# Patient Record
Sex: FEMALE | Race: AMERICAN INDIAN OR ALASKA NATIVE
[De-identification: names, ages, dates, MRNs, and addresses within clinical notes are randomized per-mention and may not be internally consistent; named-entity substitution may affect disease eponyms.]

---

## 2019-02-04 ENCOUNTER — HOSPITAL ENCOUNTER (EMERGENCY)
Dept: HOSPITAL 42 - ED | Age: 64
Discharge: HOME | End: 2019-02-04
Payer: MEDICAID

## 2019-02-04 VITALS — TEMPERATURE: 98 F | RESPIRATION RATE: 20 BRPM | HEART RATE: 70 BPM | OXYGEN SATURATION: 96 %

## 2019-02-04 VITALS — SYSTOLIC BLOOD PRESSURE: 140 MMHG | DIASTOLIC BLOOD PRESSURE: 99 MMHG

## 2019-02-04 VITALS — BODY MASS INDEX: 40.2 KG/M2

## 2019-02-04 DIAGNOSIS — R21: Primary | ICD-10-CM

## 2019-02-04 NOTE — ED PDOC
Arrival/HPI





- General


Historian: Patient





- History of Present Illness


Narrative History of Present Illness (Text): 





02/04/19 22:02


63yr old female presents today with rash to both arms x 1+ weeks. pt denies new 

soaps lotions detergents or perfumes. Patient has not taken any medications at 

home. Patient states she has had this once in the past and it resolved with a 

unknown cream. Patient states she has an appointment with the dermatologist Dr. VILLASEÑOR tomorrow. Pt states she couldnt deal with the itching so she came to the 

ER. pt denies headache, dizziness,weakness. no cp or sob. no other complaints. 





<Alicia Jules - Last Filed: 02/04/19 22:02>





<Esteban Oro - Last Filed: 02/04/19 23:01>





- General


Chief Complaint: Abnormal Skin Integrity


Time Seen by Provider: 02/04/19 18:27





Past Medical History





- Provider Review


Nursing Documentation Reviewed: Yes





- Travel History


Have you recently traveled outside [place] w/in the past 3 mons?: No





- Psychiatric


Hx Substance Use: No





- Surgical History


Hx Appendectomy: Yes


Hx Cholecystectomy: Yes


Hx Tonsillectomy: Yes





<Alicia Jules - Last Filed: 02/04/19 22:02>





Family/Social History





- Physician Review


Nursing Documentation Reviewed: Yes


Family/Social History: Unknown Family HX


Smoking Status: Never Smoked


Hx Alcohol Use: No


Hx Substance Use: No





<Alicia Jules - Last Filed: 02/04/19 22:02>





Allergies/Home Meds





<Alicia Jules - Last Filed: 02/04/19 22:02>





<Esteban Oro - Last Filed: 02/04/19 23:01>


Allergies/Adverse Reactions: 


Allergies





No Known Allergies Allergy (Verified 02/04/19 21:11)


   











Review of Systems





- Review of Systems


Constitutional: absent: Fatigue, Fevers


Respiratory: absent: SOB, Cough


Cardiovascular: absent: Chest Pain, Palpitations


Gastrointestinal: absent: Abdominal Pain, Nausea, Vomiting


Musculoskeletal: absent: Arthralgias, Back Pain, Neck Pain


Skin: Rash, Pruritis


Psychiatric: absent: Anxiety, Depression





<Alicia Jules - Last Filed: 02/04/19 22:02>





Physical Exam


Vital Signs Reviewed: Yes





Vital Signs











  Temp Pulse Resp BP Pulse Ox


 


 02/04/19 21:16  98.0 F  70  20  167/119 H  96


 


 02/04/19 20:00  97.9 F  75  16  163/89 H  95











Temperature: Afebrile


Blood Pressure: Hypertensive


Pulse: Regular


Respiratory Rate: Normal


Appearance: Positive for: Well-Appearing, Non-Toxic, Comfortable


Pain Distress: None


Mental Status: Positive for: Alert and Oriented X 3





- Systems Exam


Head: Present: Atraumatic


Mouth: Present: Moist Mucous Membranes


Respiratory/Chest: Present: Clear to Auscultation


Cardiovascular: Present: Regular Rate and Rhythm


Upper Extremity: Present: Normal ROM, NORMAL PULSES, Neurovascularly Intact, 

Capillary Refill < 2s, Other (right hand; there are multiple erythematous 

pinpoint papules and raised plaques noted to the dorsal aspect. on the left hand

there are multiplepinpoint papules and raised plaques noted to the dorsal aspect

and along the volar aspect aspect of the distal forearm. non tender. non 

blanching.  ).  No: Tenderness, Swelling


Neurological: Present: GCS=15, Speech Normal


Skin: Present: Warm, Dry


Psychiatric: Present: Alert, Oriented x 3





<Alicia Jules - Last Filed: 02/04/19 22:02>





Vital Signs











  Temp Pulse Resp BP Pulse Ox


 


 02/04/19 22:42  98.0 F  70  20  140/99 H  96


 


 02/04/19 21:16  98.0 F  70  20  167/119 H  96


 


 02/04/19 20:00  97.9 F  75  16  163/89 H  95














<Esteban Oro - Last Filed: 02/04/19 23:01>





Medical Decision Making


ED Course and Treatment: 





02/04/19 22:17


Patient is nontoxic well-appearing in no distress with stable vital signs no 

angioedema. 


Lungs are clear to auscultation bilaterally there is no wheezing noted. 








benadryl po 





pt has appointment with Dermatologist tomorrow. 


I advised taking Benadryl every 6 hours as needed for itch and apply 

hydrocortisone cream to the rash. pt was advised to see the dermatologist 

tomorrow and not miss her appointment. Advised patient to follow up with primary

care physician within the next 2 days and return if symptoms worsen persist or 

if new symptoms develop





pt was seen and evaluated by dr. oro





pt was made aware of elevated blood pressure. pt is asymptomatic. pt was advised

to f/u with PMD regarding elevated blood pressure in er and need for repeat 

testing. pt was made aware of risks associated with elevated BP. 








Patient verbalizes understanding of discharge instructions and need for 

immediate followup.








all aspects of this case were discussed the attending of record. 





Impression:rash


Benadryl every 6 hours as needed for itch


apply hydrocortisone twice daily to affected areas sparingly. 


Follow up with the Dermatologist tomorrow. 


Follow up with the primary care physician within the next 2 days. 


Return if symptoms worsen persist or if new symptoms develop: Shortness of 

breath, feeling of throat closing, difficulty speaking or any other concerning 

symptoms develop


Reassessment Condition: Re-examined, Improved





- Medication Orders


Current Medication Orders: 











Diphenhydramine HCl (Benadryl)  25 mg PO ONCE ONE


   Stop: 02/04/19 22:02











<Alicia Jules - Last Filed: 02/04/19 22:02>





- Medication Orders


Current Medication Orders: 














Discontinued Medications





Diphenhydramine HCl (Benadryl)  25 mg PO ONCE ONE


   Stop: 02/04/19 22:02


   Last Admin: 02/04/19 22:22  Dose: 25 mg











<Esteban Oro - Last Filed: 02/04/19 23:01>





- PA / NP / Resident Statement


SACHA has reviewed & agrees with the documentation as recorded.


SACHA has examined the patient and agrees with the treatment plan.





<Esteban Oro - Last Filed: 02/04/19 23:01>





Disposition/Present on Arrival





- Present on Arrival


Any Indicators Present on Arrival: No


History of DVT/PE: No


History of Uncontrolled Diabetes: No


Urinary Catheter: No


History of Decub. Ulcer: No


History Surgical Site Infection Following: None





- Disposition


Have Diagnosis and Disposition been Completed?: Yes


Disposition Time: 22:26


Patient Plan: Discharge





<Alicia Jules - Last Filed: 02/04/19 22:02>





<Esteban Oro - Last Filed: 02/04/19 23:01>





- Disposition


Diagnosis: 


 Rash





Disposition: HOME/ ROUTINE


Condition: GOOD


Discharge Instructions (ExitCare):  Skin Rash (DC)


Additional Instructions: 


Benadryl every 6 hours as needed for itch


apply hydrocortisone twice daily to affected areas sparingly. 


Follow up with the Dermatologist tomorrow. 


Follow up with the primary care physician within the next 2 days. 


Return if symptoms worsen persist or if new symptoms develop: Shortness of br

eath, feeling of throat closing, difficulty speaking or any other concerning 

symptoms develop


Prescriptions: 


DiphenhydrAMINE [Benadryl] 25 mg PO Q6H #20 cap


Hydrocortisone 2.5% 1 applic EXT BID #1 oint


Referrals: 


Ras Villaseñor MD [Staff Provider] - Follow up with primary


Freda Atkins MD [Medical Doctor] - Follow up with primary


 Service [Outside] - Follow up with primary


Forms:  [facility] (English), WORK NOTE